# Patient Record
Sex: FEMALE | Race: WHITE | NOT HISPANIC OR LATINO | Employment: OTHER | URBAN - METROPOLITAN AREA
[De-identification: names, ages, dates, MRNs, and addresses within clinical notes are randomized per-mention and may not be internally consistent; named-entity substitution may affect disease eponyms.]

---

## 2023-07-13 DIAGNOSIS — I10 HYPERTENSION, UNSPECIFIED TYPE: ICD-10-CM

## 2023-07-13 DIAGNOSIS — E55.9 VITAMIN D DEFICIENCY: ICD-10-CM

## 2023-07-13 RX ORDER — METOPROLOL TARTRATE 50 MG/1
1 TABLET ORAL EVERY 12 HOURS
COMMUNITY
Start: 2012-06-10 | End: 2023-07-20 | Stop reason: SDUPTHER

## 2023-07-20 ENCOUNTER — OFFICE VISIT (OUTPATIENT)
Dept: PRIMARY CARE | Facility: CLINIC | Age: 68
End: 2023-07-20
Payer: MEDICARE

## 2023-07-20 ENCOUNTER — LAB (OUTPATIENT)
Dept: LAB | Facility: LAB | Age: 68
End: 2023-07-20
Payer: MEDICARE

## 2023-07-20 VITALS
TEMPERATURE: 98.7 F | WEIGHT: 259 LBS | DIASTOLIC BLOOD PRESSURE: 84 MMHG | BODY MASS INDEX: 45.16 KG/M2 | SYSTOLIC BLOOD PRESSURE: 140 MMHG

## 2023-07-20 DIAGNOSIS — R43.2 TASTE ABSENT: ICD-10-CM

## 2023-07-20 DIAGNOSIS — I10 HYPERTENSION, UNSPECIFIED TYPE: ICD-10-CM

## 2023-07-20 DIAGNOSIS — I10 HYPERTENSION, UNSPECIFIED TYPE: Primary | ICD-10-CM

## 2023-07-20 DIAGNOSIS — E55.9 VITAMIN D DEFICIENCY: ICD-10-CM

## 2023-07-20 DIAGNOSIS — Z12.31 BREAST CANCER SCREENING BY MAMMOGRAM: ICD-10-CM

## 2023-07-20 DIAGNOSIS — H91.90 HEARING DIFFICULTY, UNSPECIFIED LATERALITY: ICD-10-CM

## 2023-07-20 PROBLEM — E78.5 HYPERLIPIDEMIA: Status: ACTIVE | Noted: 2023-07-20

## 2023-07-20 PROCEDURE — 99213 OFFICE O/P EST LOW 20 MIN: CPT | Performed by: FAMILY MEDICINE

## 2023-07-20 PROCEDURE — 3077F SYST BP >= 140 MM HG: CPT | Performed by: FAMILY MEDICINE

## 2023-07-20 PROCEDURE — 36415 COLL VENOUS BLD VENIPUNCTURE: CPT

## 2023-07-20 PROCEDURE — 1159F MED LIST DOCD IN RCRD: CPT | Performed by: FAMILY MEDICINE

## 2023-07-20 PROCEDURE — 3079F DIAST BP 80-89 MM HG: CPT | Performed by: FAMILY MEDICINE

## 2023-07-20 PROCEDURE — 82306 VITAMIN D 25 HYDROXY: CPT

## 2023-07-20 PROCEDURE — 1036F TOBACCO NON-USER: CPT | Performed by: FAMILY MEDICINE

## 2023-07-20 PROCEDURE — 80061 LIPID PANEL: CPT

## 2023-07-20 PROCEDURE — 80053 COMPREHEN METABOLIC PANEL: CPT

## 2023-07-20 RX ORDER — METOPROLOL TARTRATE 50 MG/1
50 TABLET ORAL EVERY 12 HOURS
Qty: 180 TABLET | Refills: 1 | Status: SHIPPED | OUTPATIENT
Start: 2023-07-20 | End: 2024-01-08 | Stop reason: SDUPTHER

## 2023-07-20 NOTE — PROGRESS NOTES
Subjective   Patient ID: Deisy Plaza is a 68 y.o. female who presents for Follow-up (EP. Here for follow up on blood pressure and medication).  HPI  Feels well, no specific complaints or concerns today except can't smell or taste for the last 5 years.  Denies any headaches. Denies any hearing loss but has some issues with pronunciation where she will have captions and has sound sensitivity    Sees Dr. Berger with [facility] and he prescribes her naltrexone   Review of Systems  Genl: The patient has been in good health without recent weight change, fevers, or night sweats  CVS: No chest pain, irregular heartbeat, shortness of breath, or swollen ankles  Resp: No cough or difficulty breathing  GI: No change in bowel habits or abdominal pain. No heartburn  : No urinary problems.    Objective   Visit Vitals  /84   Temp 37.1 °C (98.7 °F) (Oral)      Physical Exam  Alert, well-appearing.    CVS: RRR, no murmurs.     Respiratory: Normal inspirations and expirations. Clear and equal breath sounds.    GI: Soft, nontender    Assessment/Plan   Diagnoses and all orders for this visit:  Hypertension, unspecified type  Taste absent  Hearing difficulty, unspecified laterality  Breast cancer screening by mammogram    Follow up 6 mos MWE    Rosetta Schultz MD  Family Medicine   [facility]

## 2023-07-21 LAB
ALANINE AMINOTRANSFERASE (SGPT) (U/L) IN SER/PLAS: 14 U/L (ref 7–45)
ALBUMIN (G/DL) IN SER/PLAS: 4.2 G/DL (ref 3.4–5)
ALKALINE PHOSPHATASE (U/L) IN SER/PLAS: 80 U/L (ref 33–136)
ANION GAP IN SER/PLAS: 15 MMOL/L (ref 10–20)
ASPARTATE AMINOTRANSFERASE (SGOT) (U/L) IN SER/PLAS: 13 U/L (ref 9–39)
BILIRUBIN TOTAL (MG/DL) IN SER/PLAS: 1.3 MG/DL (ref 0–1.2)
CALCIDIOL (25 OH VITAMIN D3) (NG/ML) IN SER/PLAS: 32 NG/ML
CALCIUM (MG/DL) IN SER/PLAS: 9.7 MG/DL (ref 8.6–10.6)
CARBON DIOXIDE, TOTAL (MMOL/L) IN SER/PLAS: 27 MMOL/L (ref 21–32)
CHLORIDE (MMOL/L) IN SER/PLAS: 106 MMOL/L (ref 98–107)
CHOLESTEROL (MG/DL) IN SER/PLAS: 225 MG/DL (ref 0–199)
CHOLESTEROL IN HDL (MG/DL) IN SER/PLAS: 58.7 MG/DL
CHOLESTEROL/HDL RATIO: 3.8
CREATININE (MG/DL) IN SER/PLAS: 0.99 MG/DL (ref 0.5–1.05)
GFR FEMALE: 63 ML/MIN/1.73M2
GLUCOSE (MG/DL) IN SER/PLAS: 101 MG/DL (ref 74–99)
LDL: 149 MG/DL (ref 0–99)
POTASSIUM (MMOL/L) IN SER/PLAS: 4.5 MMOL/L (ref 3.5–5.3)
PROTEIN TOTAL: 7.1 G/DL (ref 6.4–8.2)
SODIUM (MMOL/L) IN SER/PLAS: 143 MMOL/L (ref 136–145)
TRIGLYCERIDE (MG/DL) IN SER/PLAS: 87 MG/DL (ref 0–149)
UREA NITROGEN (MG/DL) IN SER/PLAS: 15 MG/DL (ref 6–23)
VLDL: 17 MG/DL (ref 0–40)

## 2023-07-24 DIAGNOSIS — E78.5 HYPERLIPIDEMIA, UNSPECIFIED HYPERLIPIDEMIA TYPE: ICD-10-CM

## 2023-08-01 ENCOUNTER — TELEPHONE (OUTPATIENT)
Dept: PRIMARY CARE | Facility: CLINIC | Age: 68
End: 2023-08-01
Payer: MEDICARE

## 2023-10-25 PROBLEM — F17.200 NICOTINE USE DISORDER: Status: ACTIVE | Noted: 2019-04-17

## 2023-10-25 PROBLEM — R19.7 DIARRHEA: Status: ACTIVE | Noted: 2023-10-25

## 2023-10-25 PROBLEM — R94.31 ABNORMAL ELECTROCARDIOGRAM: Status: ACTIVE | Noted: 2023-10-25

## 2023-10-25 PROBLEM — A04.8 H. PYLORI INFECTION: Status: ACTIVE | Noted: 2023-10-25

## 2023-10-25 PROBLEM — M54.50 LOW BACK PAIN: Status: ACTIVE | Noted: 2023-10-25

## 2023-10-25 PROBLEM — K80.20 CALCULUS OF GALLBLADDER WITHOUT CHOLECYSTITIS WITHOUT OBSTRUCTION: Status: ACTIVE | Noted: 2020-01-02

## 2023-10-25 PROBLEM — R53.83 FATIGUE: Status: ACTIVE | Noted: 2023-10-25

## 2023-10-25 PROBLEM — E27.8: Status: ACTIVE | Noted: 2020-03-05

## 2023-10-25 PROBLEM — E66.01 OBESITY, CLASS III, BMI 40-49.9 (MORBID OBESITY) (MULTI): Status: ACTIVE | Noted: 2019-04-17

## 2023-10-25 PROBLEM — K52.9 COLITIS: Status: ACTIVE | Noted: 2019-04-17

## 2023-10-25 PROBLEM — M25.50 ARTHRALGIA: Status: ACTIVE | Noted: 2023-10-25

## 2023-10-25 PROBLEM — E66.813 OBESITY, CLASS III, BMI 40-49.9 (MORBID OBESITY): Status: ACTIVE | Noted: 2019-04-17

## 2023-10-25 PROBLEM — F17.200 TOBACCO USE DISORDER: Status: ACTIVE | Noted: 2020-01-02

## 2023-10-25 PROBLEM — F32.A DEPRESSION: Status: ACTIVE | Noted: 2020-01-02

## 2023-10-25 RX ORDER — OMEPRAZOLE 20 MG/1
20 CAPSULE, DELAYED RELEASE ORAL
COMMUNITY
Start: 2020-01-02 | End: 2024-01-08 | Stop reason: WASHOUT

## 2023-10-25 RX ORDER — IBUPROFEN 800 MG/1
800 TABLET ORAL EVERY 8 HOURS PRN
COMMUNITY
Start: 2023-07-13

## 2023-10-26 ENCOUNTER — LAB (OUTPATIENT)
Dept: LAB | Facility: LAB | Age: 68
End: 2023-10-26
Payer: MEDICARE

## 2023-10-26 ENCOUNTER — OFFICE VISIT (OUTPATIENT)
Dept: NEUROLOGY | Facility: CLINIC | Age: 68
End: 2023-10-26
Payer: MEDICARE

## 2023-10-26 VITALS — SYSTOLIC BLOOD PRESSURE: 167 MMHG | DIASTOLIC BLOOD PRESSURE: 99 MMHG | HEART RATE: 87 BPM | RESPIRATION RATE: 18 BRPM

## 2023-10-26 DIAGNOSIS — R43.2 TASTE IMPAIRMENT: Primary | ICD-10-CM

## 2023-10-26 DIAGNOSIS — R43.2 TASTE IMPAIRMENT: ICD-10-CM

## 2023-10-26 DIAGNOSIS — I10 ESSENTIAL (PRIMARY) HYPERTENSION: ICD-10-CM

## 2023-10-26 LAB
BASOPHILS # BLD AUTO: 0.09 X10*3/UL (ref 0–0.1)
BASOPHILS NFR BLD AUTO: 0.9 %
CRP SERPL-MCNC: 0.27 MG/DL
EOSINOPHIL # BLD AUTO: 0.1 X10*3/UL (ref 0–0.7)
EOSINOPHIL NFR BLD AUTO: 1 %
ERYTHROCYTE [DISTWIDTH] IN BLOOD BY AUTOMATED COUNT: 14.3 % (ref 11.5–14.5)
ERYTHROCYTE [SEDIMENTATION RATE] IN BLOOD BY WESTERGREN METHOD: 41 MM/H (ref 0–30)
HCT VFR BLD AUTO: 41.9 % (ref 36–46)
HGB BLD-MCNC: 13.6 G/DL (ref 12–16)
IMM GRANULOCYTES # BLD AUTO: 0.05 X10*3/UL (ref 0–0.7)
IMM GRANULOCYTES NFR BLD AUTO: 0.5 % (ref 0–0.9)
LYMPHOCYTES # BLD AUTO: 1.7 X10*3/UL (ref 1.2–4.8)
LYMPHOCYTES NFR BLD AUTO: 17.5 %
MCH RBC QN AUTO: 31.2 PG (ref 26–34)
MCHC RBC AUTO-ENTMCNC: 32.5 G/DL (ref 32–36)
MCV RBC AUTO: 96 FL (ref 80–100)
MONOCYTES # BLD AUTO: 0.74 X10*3/UL (ref 0.1–1)
MONOCYTES NFR BLD AUTO: 7.6 %
NEUTROPHILS # BLD AUTO: 7.05 X10*3/UL (ref 1.2–7.7)
NEUTROPHILS NFR BLD AUTO: 72.5 %
NRBC BLD-RTO: 0 /100 WBCS (ref 0–0)
PLATELET # BLD AUTO: 341 X10*3/UL (ref 150–450)
PMV BLD AUTO: 10.6 FL (ref 7.5–11.5)
RBC # BLD AUTO: 4.36 X10*6/UL (ref 4–5.2)
TSH SERPL-ACNC: 2.89 MIU/L (ref 0.44–3.98)
VIT B12 SERPL-MCNC: 488 PG/ML (ref 211–911)
WBC # BLD AUTO: 9.7 X10*3/UL (ref 4.4–11.3)

## 2023-10-26 PROCEDURE — 84443 ASSAY THYROID STIM HORMONE: CPT

## 2023-10-26 PROCEDURE — 86235 NUCLEAR ANTIGEN ANTIBODY: CPT

## 2023-10-26 PROCEDURE — 82175 ASSAY OF ARSENIC: CPT

## 2023-10-26 PROCEDURE — 3077F SYST BP >= 140 MM HG: CPT | Performed by: STUDENT IN AN ORGANIZED HEALTH CARE EDUCATION/TRAINING PROGRAM

## 2023-10-26 PROCEDURE — 3080F DIAST BP >= 90 MM HG: CPT | Performed by: STUDENT IN AN ORGANIZED HEALTH CARE EDUCATION/TRAINING PROGRAM

## 2023-10-26 PROCEDURE — 82607 VITAMIN B-12: CPT

## 2023-10-26 PROCEDURE — 85025 COMPLETE CBC W/AUTO DIFF WBC: CPT

## 2023-10-26 PROCEDURE — 85652 RBC SED RATE AUTOMATED: CPT

## 2023-10-26 PROCEDURE — 84630 ASSAY OF ZINC: CPT

## 2023-10-26 PROCEDURE — 99204 OFFICE O/P NEW MOD 45 MIN: CPT | Performed by: STUDENT IN AN ORGANIZED HEALTH CARE EDUCATION/TRAINING PROGRAM

## 2023-10-26 PROCEDURE — 1159F MED LIST DOCD IN RCRD: CPT | Performed by: STUDENT IN AN ORGANIZED HEALTH CARE EDUCATION/TRAINING PROGRAM

## 2023-10-26 PROCEDURE — 86225 DNA ANTIBODY NATIVE: CPT

## 2023-10-26 PROCEDURE — 83921 ORGANIC ACID SINGLE QUANT: CPT

## 2023-10-26 PROCEDURE — 86038 ANTINUCLEAR ANTIBODIES: CPT

## 2023-10-26 PROCEDURE — 36415 COLL VENOUS BLD VENIPUNCTURE: CPT

## 2023-10-26 PROCEDURE — 86140 C-REACTIVE PROTEIN: CPT

## 2023-10-26 ASSESSMENT — ENCOUNTER SYMPTOMS
CHILLS: 0
TREMORS: 0
TROUBLE SWALLOWING: 0
SINUS PRESSURE: 0
SEIZURES: 0
FEVER: 0
SORE THROAT: 0
SINUS PAIN: 0
COUGH: 0
HEADACHES: 0
APPETITE CHANGE: 0
RHINORRHEA: 0

## 2023-10-26 NOTE — PATIENT INSTRUCTIONS
Ms. Bola,    You were seen in clinic today by Dr. Arrington for your decreased smell and taste. We are ordering some labs to look for an underlying cause of these issues. One thing you can do at home is order an olfactory retraining kit, which studies have proven can help retrain your brain. This involves deeply sniffing at least four different odors for 10 seconds twice daily for at least 12 weeks.     We will call you to discuss any abnormal results of your labs, we will see you in 6 months for a virtual appointment to see how you are doing. There is a clear association with loss of smell/taste with smoking, so we recommend continue to work on quitting smoking.     Thank you, it was a pleasure taking care of you!  [facility]

## 2023-10-26 NOTE — PROGRESS NOTES
Subjective     Deisy ORNELAS Minor is a 68 y.o. year old female who presents as a NPV for evaluation of decreased taste and smell as well as issues with hearing.     HPI  She started noticing issues with smell around 2018 when others around her would mention a smell that she had not noticed. Thinks her issues with taste started around the same time, thinks she tastes less than she used to. Describes course as progressive, does think she got better after being treated with nystatin for candida last year and can smell/taste things every once in a while. Reportedly saw ENT at the time with no significant findings, no records found. She denies any hx of head trauma, new medications, or a preceding illness. 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     She denies any hearing loss or trouble understanding conversations. Only endorses having to put on closed captions for TV shows, which she states is due to the loud background noises or accents. Thinks it could be more of an issue of her paying attention to the dialogue. No FH of hearing loss, no prior hx of hearing loss. Reportedly say audiology with a normal result, no records found.     Denies any neurologic hx besides an occasional tension HA. Denies dream enactment.     Review of Systems   Constitutional:  Negative for appetite change, chills and fever.   HENT:  Negative for congestion, ear pain, rhinorrhea, sinus pressure, sinus pain, sore throat and trouble swallowing.    Respiratory:  Negative for cough.         PADGETT   Neurological:  Negative for tremors, seizures, syncope and headaches.        RUE hand numbness when driving or other position   Rheum: no dry mouth, dry eyes    Patient Active Problem List   Diagnosis    Hyperlipidemia    Hypertension    Vitamin D deficiency    Abnormal electrocardiogram    Adrenal hypertrophy (CMS/HCC)    Arthralgia    Calculus of  gallbladder without cholecystitis without obstruction    Colitis    Depression    Fatigue    H. pylori infection    Low back pain    Nicotine use disorder    Obesity, Class III, BMI 40-49.9 (morbid obesity) (CMS/[facility])    Tobacco use disorder     Past Medical History:   Diagnosis Date    Depression      Past Surgical History:   Procedure Laterality Date    TOTAL KNEE ARTHROPLASTY Bilateral      Social History     Tobacco Use    Smoking status: Some Days     Types: Cigarettes     Passive exposure: Never    Smokeless tobacco: Never   Substance Use Topics    Alcohol use: Yes     Comment: occasionally   1 cigarette per day, roll own  Smoking since 1975  Lives at home alone    family history includes Alcohol abuse in her father; Hyperlipidemia in her mother; Hypertension in her mother.    Current Outpatient Medications:     CHOLECALCIFEROL, VITAMIN D3, ORAL, Take 1 tablet by mouth twice a day., Disp: , Rfl:     ibuprofen 800 mg tablet, Take 1 tablet (800 mg) by mouth every 8 hours if needed (pain)., Disp: , Rfl:     metoprolol tartrate (Lopressor) 50 mg tablet, Take 1 tablet (50 mg) by mouth every 12 hours., Disp: 180 tablet, Rfl: 1    naltrexone HCl (NALTREXONE ORAL), Take 3 mg by mouth once daily at bedtime., Disp: , Rfl:     omeprazole (PriLOSEC) 20 mg DR capsule, Take 1 capsule (20 mg) by mouth once daily., Disp: , Rfl:   Naltrexone- for pain    Allergies   Allergen Reactions    Acyclovir Unknown    Doxycycline Hyclate Other     headaches    Hydrochlorothiazide Unknown    Metronidazole Other     Not sure - headache?    Valacyclovir Unknown    Valacyclovir Hcl Other     Headaches       Objective   Neurological Exam  Mental Status  Awake, alert and oriented to person, place and time. Oriented to person, place, time and situation. Speech is normal. Language is fluent with no aphasia. Fund of knowledge is appropriate for level of education.    Cranial Nerves  CN II: Right normal visual field. Left normal visual field.  CN  III, IV, VI: Extraocular movements intact bilaterally. Extraocular movements intact bilaterally. No nystagmus. Normal saccades. Normal smooth pursuit. Pupils equal round and reactive to light bilaterally.  CN V: Facial sensation is normal.  Right: Facial sensation is normal.  Left: Facial sensation is normal on the left.  CN VII: Full and symmetric facial movement.  Right: There is no facial weakness.  Left: There is no facial weakness.  CN VIII:  Right: Hearing is normal. Rinne is AC > BC. Initially, AC = BC, but on repeat AC > BC.  Left: Hearing is normal. Rinne is AC > BC.  Chakraborty does not lateralize. Initially right then left, but equal on repeat testing.  CN IX, X: Palate elevates symmetrically  CN XI: Shoulder shrug strength is normal.  Right: Sternocleidomastoid strength is normal. Trapezius strength is normal.  Left: Sternocleidomastoid strength is normal. Trapezius strength is normal.  CN XII: Tongue midline without atrophy or fasciculations.    Motor  Normal muscle bulk throughout.                                               Right                     Left   Shoulder abduction               5                          5  Elbow flexion                         5                          5  Elbow extension                    5                          5  Wrist flexion                           5                          5  Wrist extension                      5                          5  Hip flexion                              5                          5  Knee flexion                           5                          5  Knee extension                      5                          5  Plantarflexion                         5                          5  Dorsiflexion                            5                          5    Sensory  Light touch is normal in upper and lower extremities.     Reflexes                                            Right                      Left  Brachioradialis                     2+                         2+  Biceps                                 2+                         2+  Triceps                                2+                         2+  Achilles                                1+                         1+  Deep tendon reflexes: Unable to assess patellars 2/2 lymphedema.    Right pathological reflexes: Suprapatellar absent. Ankle clonus absent.  Left pathological reflexes: Suprapatellar absent. Ankle clonus absent.    Coordination  Right: Finger-to-nose normal.Left: Finger-to-nose normal.    Gait  Normal casual, toe, heel and tandem gait.    Physical Exam  HENT:      Right Ear: Hearing normal.      Left Ear: Hearing normal.      Ears:      Right Rinne: AC > BC.     Left Rinne: AC > BC.  Eyes:      Extraocular Movements: Extraocular movements intact and EOM normal. No nystagmus.      Pupils: Pupils are equal, round, and reactive to light.   Neurological:      Deep Tendon Reflexes:      Reflex Scores:       Tricep reflexes are 2+ on the right side and 2+ on the left side.       Bicep reflexes are 2+ on the right side and 2+ on the left side.       Brachioradialis reflexes are 2+ on the right side and 2+ on the left side.       Achilles reflexes are 1+ on the right side and 1+ on the left side.  Psychiatric:         Speech: Speech normal.         Vitals  Vitals:    10/26/23 0759   BP: (!) 167/99   Pulse: 87   Resp: 18        Assessment/Plan   Deisy Plaza is a 68 y.o. year old female who presents with a gradual hyposmia/hypogeusia that she first noticed around 2018. There was no clear inciting incident, but pt does endorse long hx of tobacco use and possibly prior chemical work exposures. Will get labs to check for underlying cause. Her complaints of issues with hearing are more likely attention, as hearing is normal on exam today (finger rub, Chakraborty/[place]) and her only issues are needing close captions for television. She reports a normal audiology evaluation in the past.      Plan:  - Olfactory retraining training kit  - Heavy metal urine  - CBC, ESR, CRP, CMP, DIANE w/ reflex, TSH w/ reflex, B12, methylmalonic, zinc  - Virtual follow up appointment in 6 months    Marj Negron, PGY2    I saw and evaluated the patient. I personally obtained the key and critical portions of the history and physical exam or was physically present for key and critical portions performed by the resident/fellow. I reviewed the resident/fellow's documentation and discussed the patient with the resident/fellow. I agree with the resident/fellow's medical decision making as documented in the note.    Lupillo Arrington MD

## 2023-10-27 LAB
ANA PATTERN: ABNORMAL
ANA SER QL HEP2 SUBST: POSITIVE
ANA TITR SER IF: ABNORMAL {TITER}
CENTROMERE B AB SER-ACNC: <0.2 AI
CHROMATIN AB SERPL-ACNC: <0.2 AI
DSDNA AB SER-ACNC: <1 IU/ML
ENA JO1 AB SER QL IA: <0.2 AI
ENA RNP AB SER IA-ACNC: 0.4 AI
ENA SCL70 AB SER QL IA: <0.2 AI
ENA SM AB SER IA-ACNC: <0.2 AI
ENA SM+RNP AB SER QL IA: <0.2 AI
ENA SS-A AB SER IA-ACNC: <0.2 AI
ENA SS-B AB SER IA-ACNC: <0.2 AI
RIBOSOMAL P AB SER-ACNC: <0.2 AI
ZINC SERPL-MCNC: 73.9 UG/DL (ref 60–120)

## 2023-10-28 LAB — METHYLMALONATE SERPL-SCNC: 0.16 UMOL/L (ref 0–0.4)

## 2023-10-29 LAB
ARSENIC 24H UR-MRATE: NORMAL UG/D (ref 0–49.9)
ARSENIC UR-MCNC: 32 UG/L (ref 0–34.9)
ARSENIC/CREAT UR: 24.2 UG/G CRT (ref 0–29.9)
COLLECT DURATION TIME SPEC: NORMAL HR
CREAT 24H UR-MRATE: NORMAL MG/D (ref 500–1400)
CREAT UR-MCNC: 132 MG/DL
LEAD 24H UR-MRATE: NORMAL UG/D (ref 0–8.1)
LEAD UR-MCNC: <5 UG/L (ref 0–5)
LEAD/CREAT UR: NORMAL UG/G CRT (ref 0–5)
MERCURY 24H UR-MRATE: NORMAL UG/D (ref 0–20)
MERCURY UR-MCNC: <2.5 UG/L (ref 0–5)
MERCURY/CREAT UR: NORMAL UG/G CRT (ref 0–20)
SPECIMEN VOL ?TM UR: NORMAL ML

## 2023-11-10 ENCOUNTER — TELEPHONE (OUTPATIENT)
Dept: NEUROSURGERY | Facility: CLINIC | Age: 68
End: 2023-11-10
Payer: MEDICARE

## 2023-11-10 NOTE — TELEPHONE ENCOUNTER
Patient called about her results for the labs that were done on 10/26/2023. I was going to give the results to her, but there was nothing noted on the final results.

## 2023-12-14 DIAGNOSIS — Z00.00 ENCOUNTER FOR MEDICARE ANNUAL WELLNESS EXAM: ICD-10-CM

## 2023-12-14 DIAGNOSIS — E78.5 HYPERLIPIDEMIA, UNSPECIFIED HYPERLIPIDEMIA TYPE: ICD-10-CM

## 2023-12-14 DIAGNOSIS — I10 HYPERTENSION, UNSPECIFIED TYPE: ICD-10-CM

## 2024-01-08 ENCOUNTER — OFFICE VISIT (OUTPATIENT)
Dept: PRIMARY CARE | Facility: CLINIC | Age: 69
End: 2024-01-08
Payer: MEDICARE

## 2024-01-08 ENCOUNTER — LAB (OUTPATIENT)
Dept: LAB | Facility: LAB | Age: 69
End: 2024-01-08
Payer: MEDICARE

## 2024-01-08 VITALS
TEMPERATURE: 98 F | DIASTOLIC BLOOD PRESSURE: 92 MMHG | SYSTOLIC BLOOD PRESSURE: 138 MMHG | BODY MASS INDEX: 46.56 KG/M2 | WEIGHT: 267 LBS

## 2024-01-08 DIAGNOSIS — I10 HYPERTENSION, UNSPECIFIED TYPE: ICD-10-CM

## 2024-01-08 DIAGNOSIS — Z12.4 ENCOUNTER FOR PAPANICOLAOU SMEAR FOR CERVICAL CANCER SCREENING: ICD-10-CM

## 2024-01-08 DIAGNOSIS — Z12.31 BREAST CANCER SCREENING BY MAMMOGRAM: ICD-10-CM

## 2024-01-08 DIAGNOSIS — Z12.4 CERVICAL CANCER SCREENING: ICD-10-CM

## 2024-01-08 DIAGNOSIS — E66.01 OBESITY, CLASS III, BMI 40-49.9 (MORBID OBESITY) (MULTI): ICD-10-CM

## 2024-01-08 DIAGNOSIS — Z00.00 HEALTH MAINTENANCE EXAMINATION: Primary | ICD-10-CM

## 2024-01-08 DIAGNOSIS — E78.5 HYPERLIPIDEMIA, UNSPECIFIED HYPERLIPIDEMIA TYPE: ICD-10-CM

## 2024-01-08 DIAGNOSIS — Z00.00 ENCOUNTER FOR MEDICARE ANNUAL WELLNESS EXAM: ICD-10-CM

## 2024-01-08 LAB
ALBUMIN SERPL BCP-MCNC: 4.1 G/DL (ref 3.4–5)
ALP SERPL-CCNC: 70 U/L (ref 33–136)
ALT SERPL W P-5'-P-CCNC: 14 U/L (ref 7–45)
ANION GAP SERPL CALC-SCNC: 16 MMOL/L (ref 10–20)
AST SERPL W P-5'-P-CCNC: 15 U/L (ref 9–39)
BASOPHILS # BLD AUTO: 0.09 X10*3/UL (ref 0–0.1)
BASOPHILS NFR BLD AUTO: 0.9 %
BILIRUB SERPL-MCNC: 1.7 MG/DL (ref 0–1.2)
BUN SERPL-MCNC: 20 MG/DL (ref 6–23)
CALCIUM SERPL-MCNC: 9.5 MG/DL (ref 8.6–10.6)
CHLORIDE SERPL-SCNC: 107 MMOL/L (ref 98–107)
CHOLEST SERPL-MCNC: 212 MG/DL (ref 0–199)
CHOLESTEROL/HDL RATIO: 3.2
CO2 SERPL-SCNC: 26 MMOL/L (ref 21–32)
CREAT SERPL-MCNC: 1.1 MG/DL (ref 0.5–1.05)
EGFRCR SERPLBLD CKD-EPI 2021: 56 ML/MIN/1.73M*2
EOSINOPHIL # BLD AUTO: 0.1 X10*3/UL (ref 0–0.7)
EOSINOPHIL NFR BLD AUTO: 1 %
ERYTHROCYTE [DISTWIDTH] IN BLOOD BY AUTOMATED COUNT: 14.6 % (ref 11.5–14.5)
GLUCOSE SERPL-MCNC: 83 MG/DL (ref 74–99)
HCT VFR BLD AUTO: 41.6 % (ref 36–46)
HDLC SERPL-MCNC: 66.2 MG/DL
HGB BLD-MCNC: 13.5 G/DL (ref 12–16)
IMM GRANULOCYTES # BLD AUTO: 0.04 X10*3/UL (ref 0–0.7)
IMM GRANULOCYTES NFR BLD AUTO: 0.4 % (ref 0–0.9)
LDLC SERPL CALC-MCNC: 132 MG/DL
LYMPHOCYTES # BLD AUTO: 2.13 X10*3/UL (ref 1.2–4.8)
LYMPHOCYTES NFR BLD AUTO: 22.2 %
MCH RBC QN AUTO: 31.5 PG (ref 26–34)
MCHC RBC AUTO-ENTMCNC: 32.5 G/DL (ref 32–36)
MCV RBC AUTO: 97 FL (ref 80–100)
MONOCYTES # BLD AUTO: 0.86 X10*3/UL (ref 0.1–1)
MONOCYTES NFR BLD AUTO: 9 %
NEUTROPHILS # BLD AUTO: 6.37 X10*3/UL (ref 1.2–7.7)
NEUTROPHILS NFR BLD AUTO: 66.5 %
NON HDL CHOLESTEROL: 146 MG/DL (ref 0–149)
NRBC BLD-RTO: 0 /100 WBCS (ref 0–0)
PLATELET # BLD AUTO: 350 X10*3/UL (ref 150–450)
POTASSIUM SERPL-SCNC: 4.6 MMOL/L (ref 3.5–5.3)
PROT SERPL-MCNC: 7 G/DL (ref 6.4–8.2)
RBC # BLD AUTO: 4.28 X10*6/UL (ref 4–5.2)
SODIUM SERPL-SCNC: 144 MMOL/L (ref 136–145)
TRIGL SERPL-MCNC: 70 MG/DL (ref 0–149)
TSH SERPL-ACNC: 3.59 MIU/L (ref 0.44–3.98)
VLDL: 14 MG/DL (ref 0–40)
WBC # BLD AUTO: 9.6 X10*3/UL (ref 4.4–11.3)

## 2024-01-08 PROCEDURE — 80061 LIPID PANEL: CPT

## 2024-01-08 PROCEDURE — 99213 OFFICE O/P EST LOW 20 MIN: CPT | Performed by: FAMILY MEDICINE

## 2024-01-08 PROCEDURE — 80053 COMPREHEN METABOLIC PANEL: CPT

## 2024-01-08 PROCEDURE — 85025 COMPLETE CBC W/AUTO DIFF WBC: CPT

## 2024-01-08 PROCEDURE — 84443 ASSAY THYROID STIM HORMONE: CPT

## 2024-01-08 PROCEDURE — 1170F FXNL STATUS ASSESSED: CPT | Performed by: FAMILY MEDICINE

## 2024-01-08 PROCEDURE — G0439 PPPS, SUBSEQ VISIT: HCPCS | Performed by: FAMILY MEDICINE

## 2024-01-08 PROCEDURE — 88175 CYTOPATH C/V AUTO FLUID REDO: CPT

## 2024-01-08 PROCEDURE — 3080F DIAST BP >= 90 MM HG: CPT | Performed by: FAMILY MEDICINE

## 2024-01-08 PROCEDURE — 1159F MED LIST DOCD IN RCRD: CPT | Performed by: FAMILY MEDICINE

## 2024-01-08 PROCEDURE — 36415 COLL VENOUS BLD VENIPUNCTURE: CPT

## 2024-01-08 PROCEDURE — 3075F SYST BP GE 130 - 139MM HG: CPT | Performed by: FAMILY MEDICINE

## 2024-01-08 RX ORDER — HYDROCHLOROTHIAZIDE 25 MG/1
25 TABLET ORAL DAILY
Qty: 90 TABLET | Refills: 1 | Status: SHIPPED | OUTPATIENT
Start: 2024-01-08 | End: 2024-01-08 | Stop reason: SDUPTHER

## 2024-01-08 RX ORDER — HYDROCHLOROTHIAZIDE 25 MG/1
25 TABLET ORAL DAILY
Qty: 90 TABLET | Refills: 1 | Status: SHIPPED | OUTPATIENT
Start: 2024-01-08 | End: 2024-07-06

## 2024-01-08 RX ORDER — METOPROLOL TARTRATE 50 MG/1
50 TABLET ORAL EVERY 12 HOURS
Qty: 180 TABLET | Refills: 1 | Status: SHIPPED | OUTPATIENT
Start: 2024-01-08

## 2024-01-08 ASSESSMENT — PATIENT HEALTH QUESTIONNAIRE - PHQ9
SUM OF ALL RESPONSES TO PHQ9 QUESTIONS 1 AND 2: 0
1. LITTLE INTEREST OR PLEASURE IN DOING THINGS: NOT AT ALL
2. FEELING DOWN, DEPRESSED OR HOPELESS: NOT AT ALL

## 2024-01-08 ASSESSMENT — ACTIVITIES OF DAILY LIVING (ADL)
DRESSING: INDEPENDENT
GROCERY_SHOPPING: INDEPENDENT
MANAGING_FINANCES: INDEPENDENT
TAKING_MEDICATION: INDEPENDENT
BATHING: INDEPENDENT
DOING_HOUSEWORK: INDEPENDENT

## 2024-01-08 NOTE — PROGRESS NOTES
Subjective   Patient ID: Deisy Plaza is a 68 y.o. female who presents for Medicare Annual Wellness Visit Subsequent (EP. Here for AMCR and pap.).  HPI  Feels well, no specific complaints or concerns today.    Bps 130-150    Takes naltrexone (through [facility]; for chronic pain)    Goes to a lymphedema clinic     Has seen cardiologist, Dr. Yaron Ferrari- had an episode of chest pain, Patient had stress test done which was normal no evidence of inducible ischemia    Never did mammogram    Colonoscopy 7/2022    Review of Systems  General: no fever or night sweats, no change in weight  Eyes: no vision disturbance  ENT:  no mouth lesions, no sore throat, and no dysphagia  CV: no chest pain, no palpitations  Resp: no shortness of breath, no cough  GI: no abdominal pain, no change in bowel habits  : no urinary problems  Skin; no rashes or new/changed skin lesions  Neuro: no headaches  Objective   Visit Vitals  BP (!) 138/92   Temp 36.7 °C (98 °F)      Physical Exam  Appears well.     HEENT: OP clear. Sclera white. PERRL. EACs and TMs clear.     Neck: supple, no masses, or lymphadenopathy. No thyromegaly.     CVS: RRR. No murmurs.     Lungs: clear with normal inspirations and expirations.    Breasts: Symmetrical, no masses, no skin retraction or dimpling. No nipple discharge. No axillary nodes.    Abd: soft, NT, no masses or HSM.    Pelvic: No vulvar lesions. No vaginal lesions or discharge. Normal appearing cervix with no lesions. No adnexal masses. Normal uterus.    Rectal: No masses. Guaiac negative stool.    Skin: No suspicious lesions.    Assessment/Plan   Diagnoses and all orders for this visit:  Health maintenance examination  Encounter for Papanicolaou smear for cervical cancer screening  -     THINPREP PAP TEST  Cervical cancer screening  -     THINPREP PAP TEST  Hypertension, unspecified type  -     metoprolol tartrate (Lopressor) 50 mg tablet; Take 1 tablet by mouth every 12 hours.  -      hydroCHLOROthiazide (HYDRODiuril) 25 mg tablet; Take 1 tablet (25 mg) by mouth once daily.  Breast cancer screening by mammogram  -     BI mammo bilateral screening tomosynthesis; Future    Like her cardiologist I went over the risk factors for coronary artery disease with her; her cardiologist calculated her 10-year atherosclerotic risk to be 16.8%; he recommended a statin which I agree with but she is not interested in starting a statin; like today her blood pressures were not not controlled when she was seen in cardiologist office and again explained to her the risk of having a stroke or heart attack with uncontrolled blood pressures, she agrees to start hydrochlorothiazide    Encouraged her to follow-up with the cardiologist- note mentions doing Echo at follow up appointment     Defers all immunizations today     Rosetta Schultz MD  Family Medicine   [facility]

## 2024-01-10 PROBLEM — E27.8: Status: RESOLVED | Noted: 2020-03-05 | Resolved: 2024-01-10

## 2024-01-23 LAB
CYTOLOGY CMNT CVX/VAG CYTO-IMP: NORMAL
LAB AP HPV GENOTYPE QUESTION: YES
LAB AP HPV HR: NORMAL
LABORATORY COMMENT REPORT: NORMAL
LMP START DATE: NORMAL
PATH REPORT.TOTAL CANCER: NORMAL

## 2024-07-29 DIAGNOSIS — I10 HYPERTENSION, UNSPECIFIED TYPE: ICD-10-CM

## 2024-07-29 NOTE — TELEPHONE ENCOUNTER
Pt called ldm on machine requesting Rx refill on   Metoprolol 50 mg   Send to [facility]     Last seen 01/08/24  None Scheduled   Last B/w 01/08/24    Called ldm on v/m requesting a return call to schedule

## 2024-08-01 RX ORDER — METOPROLOL TARTRATE 50 MG/1
50 TABLET ORAL EVERY 12 HOURS
Qty: 180 TABLET | Refills: 0 | Status: SHIPPED | OUTPATIENT
Start: 2024-08-01

## 2024-08-01 NOTE — TELEPHONE ENCOUNTER
LAST APPT 01/08/24  NEXT APPT - NONE, office ldm on machine to call office to schedule follow up appt.   LAST BW

## 2024-08-02 NOTE — TELEPHONE ENCOUNTER
ML on this number listed  [phone] advising of multiple messages already left on [phone] number listed. Advised to call office to schedule apt and get refills.

## 2024-09-05 DIAGNOSIS — E78.5 HYPERLIPIDEMIA, UNSPECIFIED HYPERLIPIDEMIA TYPE: ICD-10-CM

## 2024-09-05 DIAGNOSIS — I10 HYPERTENSION, UNSPECIFIED TYPE: ICD-10-CM

## 2024-09-12 ENCOUNTER — LAB (OUTPATIENT)
Dept: LAB | Facility: LAB | Age: 69
End: 2024-09-12
Payer: MEDICARE

## 2024-09-12 DIAGNOSIS — I10 HYPERTENSION, UNSPECIFIED TYPE: ICD-10-CM

## 2024-09-12 DIAGNOSIS — E78.5 HYPERLIPIDEMIA, UNSPECIFIED HYPERLIPIDEMIA TYPE: ICD-10-CM

## 2024-09-12 LAB
ALBUMIN SERPL BCP-MCNC: 3.8 G/DL (ref 3.4–5)
ALP SERPL-CCNC: 86 U/L (ref 33–136)
ALT SERPL W P-5'-P-CCNC: 9 U/L (ref 7–45)
ANION GAP SERPL CALC-SCNC: 16 MMOL/L (ref 10–20)
AST SERPL W P-5'-P-CCNC: 10 U/L (ref 9–39)
BILIRUB SERPL-MCNC: 0.6 MG/DL (ref 0–1.2)
BUN SERPL-MCNC: 26 MG/DL (ref 6–23)
CALCIUM SERPL-MCNC: 9.4 MG/DL (ref 8.6–10.6)
CHLORIDE SERPL-SCNC: 107 MMOL/L (ref 98–107)
CHOLEST SERPL-MCNC: 195 MG/DL (ref 0–199)
CHOLESTEROL/HDL RATIO: 4.2
CO2 SERPL-SCNC: 27 MMOL/L (ref 21–32)
CREAT SERPL-MCNC: 1.29 MG/DL (ref 0.5–1.05)
EGFRCR SERPLBLD CKD-EPI 2021: 46 ML/MIN/1.73M*2
GLUCOSE SERPL-MCNC: 97 MG/DL (ref 74–99)
HDLC SERPL-MCNC: 46.8 MG/DL
LDLC SERPL CALC-MCNC: 132 MG/DL
NON HDL CHOLESTEROL: 148 MG/DL (ref 0–149)
POTASSIUM SERPL-SCNC: 4.7 MMOL/L (ref 3.5–5.3)
PROT SERPL-MCNC: 6.6 G/DL (ref 6.4–8.2)
SODIUM SERPL-SCNC: 145 MMOL/L (ref 136–145)
TRIGL SERPL-MCNC: 82 MG/DL (ref 0–149)
VLDL: 16 MG/DL (ref 0–40)

## 2024-09-12 PROCEDURE — 80061 LIPID PANEL: CPT

## 2024-09-12 PROCEDURE — 80053 COMPREHEN METABOLIC PANEL: CPT

## 2024-09-12 PROCEDURE — 36415 COLL VENOUS BLD VENIPUNCTURE: CPT

## 2024-09-17 ENCOUNTER — APPOINTMENT (OUTPATIENT)
Dept: PRIMARY CARE | Facility: CLINIC | Age: 69
End: 2024-09-17
Payer: MEDICARE

## 2024-11-12 ENCOUNTER — APPOINTMENT (OUTPATIENT)
Dept: PRIMARY CARE | Facility: CLINIC | Age: 69
End: 2024-11-12
Payer: MEDICARE

## 2024-11-12 VITALS
BODY MASS INDEX: 46.56 KG/M2 | OXYGEN SATURATION: 97 % | SYSTOLIC BLOOD PRESSURE: 142 MMHG | TEMPERATURE: 98.6 F | DIASTOLIC BLOOD PRESSURE: 88 MMHG | HEART RATE: 80 BPM | WEIGHT: 267 LBS

## 2024-11-12 DIAGNOSIS — Z00.00 HEALTH MAINTENANCE EXAMINATION: ICD-10-CM

## 2024-11-12 DIAGNOSIS — I10 HYPERTENSION, UNSPECIFIED TYPE: Primary | ICD-10-CM

## 2024-11-12 DIAGNOSIS — M25.50 ARTHRALGIA, UNSPECIFIED JOINT: ICD-10-CM

## 2024-11-12 DIAGNOSIS — N18.31 CHRONIC KIDNEY DISEASE, STAGE 3A (MULTI): ICD-10-CM

## 2024-11-12 PROCEDURE — G2211 COMPLEX E/M VISIT ADD ON: HCPCS | Performed by: FAMILY MEDICINE

## 2024-11-12 PROCEDURE — 3079F DIAST BP 80-89 MM HG: CPT | Performed by: FAMILY MEDICINE

## 2024-11-12 PROCEDURE — 99214 OFFICE O/P EST MOD 30 MIN: CPT | Performed by: FAMILY MEDICINE

## 2024-11-12 PROCEDURE — 1123F ACP DISCUSS/DSCN MKR DOCD: CPT | Performed by: FAMILY MEDICINE

## 2024-11-12 PROCEDURE — 3077F SYST BP >= 140 MM HG: CPT | Performed by: FAMILY MEDICINE

## 2024-11-12 PROCEDURE — 1159F MED LIST DOCD IN RCRD: CPT | Performed by: FAMILY MEDICINE

## 2024-11-12 RX ORDER — IBUPROFEN 800 MG/1
800 TABLET ORAL EVERY 8 HOURS PRN
Qty: 30 TABLET | Refills: 0 | Status: SHIPPED | OUTPATIENT
Start: 2024-11-12

## 2024-11-12 RX ORDER — FUROSEMIDE 40 MG/1
40 TABLET ORAL DAILY
COMMUNITY

## 2024-11-12 RX ORDER — METOPROLOL TARTRATE 50 MG/1
50 TABLET ORAL EVERY 12 HOURS
Qty: 180 TABLET | Refills: 0 | Status: SHIPPED | OUTPATIENT
Start: 2024-11-12

## 2024-11-12 NOTE — PROGRESS NOTES
Subjective   Patient ID: Deisy Plaza is a 69 y.o. female who presents for Follow-up (EP.).  HPI  Feels well, no specific complaints or concerns today.    Blood pressure at home high 130s-150s    No longer taking hydrochlorothiazide, was prescribed Lasix 40 mg the last time she saw her cardiologist, Dr. Ferrari when BNP was elevated; Dr. Ferrari's last ov note reviewed    Takes naltrexone for chronic pain, prescribed by a holistic practioner   Review of Systems  Genl: The patient has been in good health without recent weight change, fevers, or night sweats  CVS: No chest pain, irregular heartbeat, shortness of breath, or swollen ankles  Resp: No cough or difficulty breathing  GI: No change in bowel habits or abdominal pain.   : No urinary problems.   Objective   /88 (BP Location: Right arm)   Pulse 80   Temp 37 °C (98.6 °F) (Oral)   Wt 121 kg (267 lb)   SpO2 97%   BMI 46.56 kg/m²    Physical Exam  Alert, well-appearing.    CVS: RRR, no murmurs.     Respiratory: Clear and equal breath sounds.    GI: Soft, nontender.     Reviewed bw from September: CR 1.29 GFR 46, glucose 97    Assessment/Plan   Diagnoses and all orders for this visit:  Hypertension, unspecified type  -     metoprolol tartrate (Lopressor) 50 mg tablet; Take 1 tablet by mouth every 12 hours.  -     Referral to Clinical Pharmacy; Future  Chronic kidney disease, stage 3a (Multi)  Arthralgia, unspecified joint  -     ibuprofen 800 mg tablet; Take 1 tablet (800 mg) by mouth every 8 hours if needed (pain).      Rosetta Schultz MD  Family Medicine   [facility]

## 2025-05-19 ENCOUNTER — TELEPHONE (OUTPATIENT)
Dept: PRIMARY CARE | Facility: CLINIC | Age: 70
End: 2025-05-19
Payer: MEDICARE

## 2025-05-19 DIAGNOSIS — I10 HYPERTENSION, UNSPECIFIED TYPE: ICD-10-CM

## 2025-05-19 DIAGNOSIS — M25.50 ARTHRALGIA, UNSPECIFIED JOINT: ICD-10-CM

## 2025-05-19 RX ORDER — IBUPROFEN 800 MG/1
800 TABLET, FILM COATED ORAL EVERY 8 HOURS PRN
Qty: 30 TABLET | Refills: 0 | Status: SHIPPED | OUTPATIENT
Start: 2025-05-19

## 2025-05-19 RX ORDER — METOPROLOL TARTRATE 50 MG/1
50 TABLET ORAL EVERY 12 HOURS
Qty: 60 TABLET | Refills: 0 | Status: SHIPPED | OUTPATIENT
Start: 2025-05-19

## 2025-05-19 NOTE — TELEPHONE ENCOUNTER
Pt called requesting Rx refill on both her   Metoprolol 50 mg   Ibuprofen 800 mg   Send to [facility] on [address]     Last seen 11/12/24  Scheduled 08/18/25  Last B/w 09/12/24    Pt has been low and cutting them in half.

## 2025-06-16 ENCOUNTER — TELEPHONE (OUTPATIENT)
Dept: PRIMARY CARE | Facility: CLINIC | Age: 70
End: 2025-06-16
Payer: MEDICARE

## 2025-06-16 DIAGNOSIS — R53.83 FATIGUE, UNSPECIFIED TYPE: ICD-10-CM

## 2025-06-16 DIAGNOSIS — I10 HYPERTENSION, UNSPECIFIED TYPE: ICD-10-CM

## 2025-06-16 DIAGNOSIS — E78.5 HYPERLIPIDEMIA, UNSPECIFIED HYPERLIPIDEMIA TYPE: ICD-10-CM

## 2025-06-16 DIAGNOSIS — N18.31 CHRONIC KIDNEY DISEASE, STAGE 3A (MULTI): ICD-10-CM

## 2025-06-24 LAB
ALBUMIN SERPL-MCNC: 4.2 G/DL (ref 3.6–5.1)
ALP SERPL-CCNC: 91 U/L (ref 37–153)
ALT SERPL-CCNC: 10 U/L (ref 6–29)
ANION GAP SERPL CALCULATED.4IONS-SCNC: 9 MMOL/L (CALC) (ref 7–17)
AST SERPL-CCNC: 11 U/L (ref 10–35)
BASOPHILS # BLD AUTO: 76 CELLS/UL (ref 0–200)
BASOPHILS NFR BLD AUTO: 0.8 %
BILIRUB SERPL-MCNC: 0.9 MG/DL (ref 0.2–1.2)
BUN SERPL-MCNC: 20 MG/DL (ref 7–25)
CALCIUM SERPL-MCNC: 9.2 MG/DL (ref 8.6–10.4)
CHLORIDE SERPL-SCNC: 106 MMOL/L (ref 98–110)
CHOLEST SERPL-MCNC: 190 MG/DL
CHOLEST/HDLC SERPL: 3.8 (CALC)
CO2 SERPL-SCNC: 26 MMOL/L (ref 20–32)
CREAT SERPL-MCNC: 1.23 MG/DL (ref 0.5–1.05)
EGFRCR SERPLBLD CKD-EPI 2021: 48 ML/MIN/1.73M2
EOSINOPHIL # BLD AUTO: 95 CELLS/UL (ref 15–500)
EOSINOPHIL NFR BLD AUTO: 1 %
ERYTHROCYTE [DISTWIDTH] IN BLOOD BY AUTOMATED COUNT: 13.3 % (ref 11–15)
GLUCOSE SERPL-MCNC: 96 MG/DL (ref 65–99)
HCT VFR BLD AUTO: 40.9 % (ref 35–45)
HDLC SERPL-MCNC: 50 MG/DL
HGB BLD-MCNC: 12.9 G/DL (ref 11.7–15.5)
LDLC SERPL CALC-MCNC: 120 MG/DL (CALC)
LYMPHOCYTES # BLD AUTO: 1435 CELLS/UL (ref 850–3900)
LYMPHOCYTES NFR BLD AUTO: 15.1 %
MCH RBC QN AUTO: 30.6 PG (ref 27–33)
MCHC RBC AUTO-ENTMCNC: 31.5 G/DL (ref 32–36)
MCV RBC AUTO: 97.1 FL (ref 80–100)
MONOCYTES # BLD AUTO: 675 CELLS/UL (ref 200–950)
MONOCYTES NFR BLD AUTO: 7.1 %
NEUTROPHILS # BLD AUTO: 7220 CELLS/UL (ref 1500–7800)
NEUTROPHILS NFR BLD AUTO: 76 %
NONHDLC SERPL-MCNC: 140 MG/DL (CALC)
PLATELET # BLD AUTO: 373 THOUSAND/UL (ref 140–400)
PMV BLD REES-ECKER: 10.4 FL (ref 7.5–12.5)
POTASSIUM SERPL-SCNC: 4.9 MMOL/L (ref 3.5–5.3)
PROT SERPL-MCNC: 6.9 G/DL (ref 6.1–8.1)
RBC # BLD AUTO: 4.21 MILLION/UL (ref 3.8–5.1)
SODIUM SERPL-SCNC: 141 MMOL/L (ref 135–146)
T4 FREE SERPL-MCNC: 1.1 NG/DL (ref 0.8–1.8)
TRIGL SERPL-MCNC: 101 MG/DL
TSH SERPL-ACNC: 4.65 MIU/L (ref 0.4–4.5)
WBC # BLD AUTO: 9.5 THOUSAND/UL (ref 3.8–10.8)

## 2025-06-26 ENCOUNTER — APPOINTMENT (OUTPATIENT)
Dept: PRIMARY CARE | Facility: CLINIC | Age: 70
End: 2025-06-26
Payer: MEDICARE

## 2025-06-26 ENCOUNTER — TELEPHONE (OUTPATIENT)
Dept: PRIMARY CARE | Facility: CLINIC | Age: 70
End: 2025-06-26

## 2025-06-26 VITALS
HEART RATE: 113 BPM | RESPIRATION RATE: 20 BRPM | HEIGHT: 64 IN | WEIGHT: 270.3 LBS | SYSTOLIC BLOOD PRESSURE: 168 MMHG | BODY MASS INDEX: 46.15 KG/M2 | TEMPERATURE: 98.6 F | DIASTOLIC BLOOD PRESSURE: 92 MMHG | OXYGEN SATURATION: 98 %

## 2025-06-26 DIAGNOSIS — I50.32 CHRONIC DIASTOLIC CONGESTIVE HEART FAILURE: ICD-10-CM

## 2025-06-26 DIAGNOSIS — N18.31 CHRONIC KIDNEY DISEASE, STAGE 3A (MULTI): ICD-10-CM

## 2025-06-26 DIAGNOSIS — I10 HYPERTENSION, UNSPECIFIED TYPE: Primary | ICD-10-CM

## 2025-06-26 DIAGNOSIS — I89.0 LYMPHEDEMA: ICD-10-CM

## 2025-06-26 DIAGNOSIS — Z00.00 HEALTH MAINTENANCE EXAMINATION: ICD-10-CM

## 2025-06-26 PROBLEM — A04.8 HELICOBACTER PYLORI INFECTION: Status: ACTIVE | Noted: 2025-06-26

## 2025-06-26 PROBLEM — R07.9 CHEST PAIN: Status: ACTIVE | Noted: 2023-07-27

## 2025-06-26 PROBLEM — R19.7 DIARRHEA: Status: ACTIVE | Noted: 2025-06-26

## 2025-06-26 PROBLEM — R94.31 ABNORMAL ELECTROCARDIOGRAPHY: Status: ACTIVE | Noted: 2025-06-26

## 2025-06-26 PROBLEM — F17.200 SMOKER: Status: ACTIVE | Noted: 2023-07-27

## 2025-06-26 PROCEDURE — 1159F MED LIST DOCD IN RCRD: CPT | Performed by: FAMILY MEDICINE

## 2025-06-26 PROCEDURE — 99214 OFFICE O/P EST MOD 30 MIN: CPT | Performed by: FAMILY MEDICINE

## 2025-06-26 PROCEDURE — 4004F PT TOBACCO SCREEN RCVD TLK: CPT | Performed by: FAMILY MEDICINE

## 2025-06-26 PROCEDURE — 1123F ACP DISCUSS/DSCN MKR DOCD: CPT | Performed by: FAMILY MEDICINE

## 2025-06-26 PROCEDURE — 1124F ACP DISCUSS-NO DSCNMKR DOCD: CPT | Performed by: FAMILY MEDICINE

## 2025-06-26 PROCEDURE — 3077F SYST BP >= 140 MM HG: CPT | Performed by: FAMILY MEDICINE

## 2025-06-26 PROCEDURE — 3008F BODY MASS INDEX DOCD: CPT | Performed by: FAMILY MEDICINE

## 2025-06-26 PROCEDURE — G2211 COMPLEX E/M VISIT ADD ON: HCPCS | Performed by: FAMILY MEDICINE

## 2025-06-26 PROCEDURE — 3080F DIAST BP >= 90 MM HG: CPT | Performed by: FAMILY MEDICINE

## 2025-06-26 RX ORDER — METOPROLOL SUCCINATE 100 MG/1
100 TABLET, EXTENDED RELEASE ORAL DAILY
Qty: 90 TABLET | Refills: 1 | Status: SHIPPED | OUTPATIENT
Start: 2025-06-26 | End: 2026-06-26

## 2025-06-26 RX ORDER — METOPROLOL TARTRATE 50 MG/1
50 TABLET ORAL EVERY 12 HOURS
Qty: 180 TABLET | Refills: 1 | Status: SHIPPED | OUTPATIENT
Start: 2025-06-26 | End: 2025-06-26 | Stop reason: CLARIF

## 2025-06-26 ASSESSMENT — PATIENT HEALTH QUESTIONNAIRE - PHQ9
8. MOVING OR SPEAKING SO SLOWLY THAT OTHER PEOPLE COULD HAVE NOTICED. OR THE OPPOSITE, BEING SO FIGETY OR RESTLESS THAT YOU HAVE BEEN MOVING AROUND A LOT MORE THAN USUAL: NOT AT ALL
7. TROUBLE CONCENTRATING ON THINGS, SUCH AS READING THE NEWSPAPER OR WATCHING TELEVISION: SEVERAL DAYS
4. FEELING TIRED OR HAVING LITTLE ENERGY: NEARLY EVERY DAY
9. THOUGHTS THAT YOU WOULD BE BETTER OFF DEAD, OR OF HURTING YOURSELF: NOT AT ALL
5. POOR APPETITE OR OVEREATING: SEVERAL DAYS
SUM OF ALL RESPONSES TO PHQ9 QUESTIONS 1 AND 2: 3
2. FEELING DOWN, DEPRESSED OR HOPELESS: NOT AT ALL
6. FEELING BAD ABOUT YOURSELF - OR THAT YOU ARE A FAILURE OR HAVE LET YOURSELF OR YOUR FAMILY DOWN: NOT AT ALL
3. TROUBLE FALLING OR STAYING ASLEEP OR SLEEPING TOO MUCH: SEVERAL DAYS
1. LITTLE INTEREST OR PLEASURE IN DOING THINGS: NEARLY EVERY DAY
SUM OF ALL RESPONSES TO PHQ QUESTIONS 1-9: 9

## 2025-06-26 NOTE — TELEPHONE ENCOUNTER
Left patient message on v/m that Metoprolol  mg once daily was called in -- replacing the Metoprolol 50 mg bid due to her insurance request. Advised the patient to call with any questions or concerns.

## 2025-06-26 NOTE — PROGRESS NOTES
"Subjective   Patient ID: Deisy Plaza is a 69 y.o. female who presents for Follow-up (EPV, F/U HTN/Has been only taking 1/2 of B/P so she wouldn't run out of medication before this appointment ).    History of Present Illness  Deisy Plaza is a 69 year old female with hypertension who presents for a follow-up visit.    She attributes her elevated blood pressure to stress from family issues and obstacles encountered on her way to the appointment. She has not been consistently monitoring her blood pressure at home due to issues with her blood pressure monitors, having \"gone through three of them\".     Her cardiologist is Dr. Yaron Ferrari- his ov note from December 2024 reviewed- he reports that she is established with the lymphedema clinic, h/o HL, HTN, and diastolic heart failure    She is currently taking metoprolol 50 mg twice daily (though she has only been taking 50 mg daily until she was able to get a refill at today's appt) and Lasix (furosemide) 20 mg every other day. She has adjusted her Lasix dosage due to side effects of lethargy and dehydration, which were discussed with her cardiologist. Missing doses of Lasix led to swelling, but she denies current swelling or fluid retention. Her legs have been better recently, although she experienced swelling when she missed her medication for two days while staying with her mother.    She discusses her physical activity, noting increased activity but also increased pain, which she attributes to muscle soreness from being more active after a period of inactivity. She reports having two herniated discs in her back.     Regarding her diet, she has reduced her intake of pastas and breads and occasionally consumes fruit, following Weight Watcher principles. She drinks Gatorade Zero occasionally. She is not diabetic and has been advised to drink enough water despite taking a diuretic.      Review of Systems  Genl: The patient has been in good health without recent " "weight change, fevers, or night sweats  CVS: No chest pain, irregular heartbeat, shortness of breath, or swollen ankles  Resp: No cough or difficulty breathing  GI: No change in bowel habits or abdominal pain. No heartburn  : No urinary problems.     Objective     BP (!) 168/92 (BP Location: Left arm, Patient Position: Sitting)   Pulse (!) 113   Temp 37 °C (98.6 °F) (Temporal)   Resp 20   Ht 1.613 m (5' 3.5\")   Wt 123 kg (270 lb 4.8 oz)   SpO2 98%   BMI 47.12 kg/m²      Physical Exam  Alert, well-appearing.    CVS: RRR, no murmurs. + peripheral edema/chronic lymphedema    Respiratory: Clear and equal breath sounds.    GI: Soft, nontender.    Reviewed recent labs  Assessment/Plan     Diagnoses and all orders for this visit:  Hypertension, unspecified type  -     metoprolol tartrate (Lopressor) 50 mg tablet; Take 1 tablet by mouth every 12 hours.  Lymphedema  Health maintenance examination  -     Follow Up In [facility] - PCP - Medicare Annual; Future    Assessment & Plan  Hypertension  Blood pressure elevated at 168/92 mmHg. Inconsistent medication adherence noted. Home readings vary between 130s/140s over 89/90. Discussed importance of adherence and accurate monitoring. Recommended Omron arm cuff monitor (script given).  - Advise home blood pressure monitoring, report if consistently >140 mmHg systolic.    -Follow up 3-4 mos for JANNIE Schultz MD    This medical note was created with the assistance of artificial intelligence (AI) for documentation purposes. The content has been reviewed and confirmed by the healthcare provider for accuracy and completeness. Patient consented to the use of audio recording and use of AI during their visit.    "

## 2025-06-26 NOTE — TELEPHONE ENCOUNTER
Let her know that insurance is not covering to take the metoprolol 50 mg bid, they want her to take ext release formulation metoprolol 100 mg once daily

## 2025-08-18 ENCOUNTER — APPOINTMENT (OUTPATIENT)
Dept: PRIMARY CARE | Facility: CLINIC | Age: 70
End: 2025-08-18
Payer: MEDICARE

## 2025-09-25 ENCOUNTER — APPOINTMENT (OUTPATIENT)
Dept: PRIMARY CARE | Facility: CLINIC | Age: 70
End: 2025-09-25
Payer: MEDICARE